# Patient Record
Sex: MALE | Race: OTHER
[De-identification: names, ages, dates, MRNs, and addresses within clinical notes are randomized per-mention and may not be internally consistent; named-entity substitution may affect disease eponyms.]

---

## 2019-10-02 ENCOUNTER — HOSPITAL ENCOUNTER (OUTPATIENT)
Dept: HOSPITAL 76 - EMS | Age: 39
End: 2019-10-02
Attending: SURGERY
Payer: COMMERCIAL

## 2019-10-02 ENCOUNTER — HOSPITAL ENCOUNTER (EMERGENCY)
Dept: HOSPITAL 76 - ED | Age: 39
Discharge: HOME | End: 2019-10-02
Payer: COMMERCIAL

## 2019-10-02 VITALS — DIASTOLIC BLOOD PRESSURE: 80 MMHG | SYSTOLIC BLOOD PRESSURE: 135 MMHG

## 2019-10-02 DIAGNOSIS — F43.9: ICD-10-CM

## 2019-10-02 DIAGNOSIS — R07.9: Primary | ICD-10-CM

## 2019-10-02 DIAGNOSIS — H66.002: ICD-10-CM

## 2019-10-02 DIAGNOSIS — R07.89: Primary | ICD-10-CM

## 2019-10-02 LAB
ALBUMIN DIAFP-MCNC: 4.7 G/DL (ref 3.2–5.5)
ALBUMIN/GLOB SERPL: 1.5 {RATIO} (ref 1–2.2)
ALP SERPL-CCNC: 57 IU/L (ref 42–121)
ALT SERPL W P-5'-P-CCNC: 49 IU/L (ref 10–60)
ANION GAP SERPL CALCULATED.4IONS-SCNC: 8 MMOL/L (ref 6–13)
AST SERPL W P-5'-P-CCNC: 26 IU/L (ref 10–42)
BASOPHILS NFR BLD AUTO: 0.1 10^3/UL (ref 0–0.1)
BASOPHILS NFR BLD AUTO: 0.6 %
BILIRUB BLD-MCNC: 1.1 MG/DL (ref 0.2–1)
BUN SERPL-MCNC: 20 MG/DL (ref 6–20)
CALCIUM UR-MCNC: 9.4 MG/DL (ref 8.5–10.3)
CHLORIDE SERPL-SCNC: 104 MMOL/L (ref 101–111)
CO2 SERPL-SCNC: 26 MMOL/L (ref 21–32)
CREAT SERPLBLD-SCNC: 1 MG/DL (ref 0.6–1.2)
EOSINOPHIL # BLD AUTO: 0.1 10^3/UL (ref 0–0.7)
EOSINOPHIL NFR BLD AUTO: 1.4 %
ERYTHROCYTE [DISTWIDTH] IN BLOOD BY AUTOMATED COUNT: 12.6 % (ref 12–15)
GFRSERPLBLD MDRD-ARVRAT: 84 ML/MIN/{1.73_M2} (ref 89–?)
GLOBULIN SER-MCNC: 3.1 G/DL (ref 2.1–4.2)
GLUCOSE SERPL-MCNC: 101 MG/DL (ref 70–100)
HGB UR QL STRIP: 17 G/DL (ref 14–18)
LIPASE SERPL-CCNC: 52 U/L (ref 22–51)
LYMPHOCYTES # SPEC AUTO: 1.7 10^3/UL (ref 1.5–3.5)
LYMPHOCYTES NFR BLD AUTO: 20.1 %
MCH RBC QN AUTO: 31.1 PG (ref 27–31)
MCHC RBC AUTO-ENTMCNC: 35.4 G/DL (ref 32–36)
MCV RBC AUTO: 87.9 FL (ref 80–94)
MONOCYTES # BLD AUTO: 0.6 10^3/UL (ref 0–1)
MONOCYTES NFR BLD AUTO: 7.4 %
NEUTROPHILS # BLD AUTO: 5.9 10^3/UL (ref 1.5–6.6)
NEUTROPHILS # SNV AUTO: 8.4 X10^3/UL (ref 4.8–10.8)
NEUTROPHILS NFR BLD AUTO: 70 %
PDW BLD AUTO: 9.7 FL (ref 7.4–11.4)
PLATELET # BLD: 286 10^3/UL (ref 130–450)
PROT SPEC-MCNC: 7.8 G/DL (ref 6.7–8.2)
RBC MAR: 5.46 10^6/UL (ref 4.7–6.1)
SODIUM SERPLBLD-SCNC: 138 MMOL/L (ref 135–145)

## 2019-10-02 PROCEDURE — 99283 EMERGENCY DEPT VISIT LOW MDM: CPT

## 2019-10-02 PROCEDURE — 71045 X-RAY EXAM CHEST 1 VIEW: CPT

## 2019-10-02 PROCEDURE — 36415 COLL VENOUS BLD VENIPUNCTURE: CPT

## 2019-10-02 PROCEDURE — 99284 EMERGENCY DEPT VISIT MOD MDM: CPT

## 2019-10-02 PROCEDURE — 84484 ASSAY OF TROPONIN QUANT: CPT

## 2019-10-02 PROCEDURE — 85025 COMPLETE CBC W/AUTO DIFF WBC: CPT

## 2019-10-02 PROCEDURE — 80053 COMPREHEN METABOLIC PANEL: CPT

## 2019-10-02 PROCEDURE — 83690 ASSAY OF LIPASE: CPT

## 2019-10-02 PROCEDURE — 93005 ELECTROCARDIOGRAM TRACING: CPT

## 2019-10-02 NOTE — XRAY REPORT
Reason:  chest pain

Procedure Date:  10/02/2019   

Accession Number:  [id number] / [id number]                    

Procedure:  XR  - Chest 1 View X-Ray CPT Code:  71559

 

FULL RESULT:

 

 

EXAM:

CHEST RADIOGRAPHY

 

EXAM DATE: 10/2/2019 11:27 AM.

 

CLINICAL HISTORY: Chest pain that radiates down left arm.

 

COMPARISON: None.

 

TECHNIQUE: 1 view.

 

FINDINGS:

Lungs/Pleura: No focal opacities evident. No pleural effusion. No 

pneumothorax.

 

Mediastinum: Within exam limitations, the cardiomediastinal contour is 

normal.

 

Other: None.

 

IMPRESSION:

1. No acute disease in the chest.

 

RADIA

## 2019-10-02 NOTE — ED PHYSICIAN DOCUMENTATION
PD HPI DYSPNEA





- Stated complaint


Stated Complaint: CHEST PAIN/LT ARM PX





- Chief complaint


Chief Complaint: Cardiac





- History obtained from


History obtained from: Patient, Family





- History of Present Illness


Timing - onset: Enter  time (0900), Today


Timing - onset during: Emotional event


Timing - duration: Minutes (40)


Timing - details: Abrupt onset, Still present in ED (reduced to left shoulder 

pain)


Inciting event(s): URI, Emotional event


Improved by: Other (having a say at a meeting)


Worsened by: No: Exertion, Coughing


Associated symptoms: Cough, Chest pain / discomfort.  No: Fever, Wheezing


Similar symptoms before: Has not had sx before


Recently seen: Not recently seen





- Additional information


Additional information: 





Previously well 39-year-old male was at work today when he began to experience 

some substernal chest pain with radiation into his left arm that he states 

occurred with stress at work.  He states that the stress is related to a 

coworker a single individual and there was a meeting regarding the individual 

today and the patient was able to get his say in.  He states that following that

he felt some improvement in his pain but he has this persistence of pain in his 

left shoulder.  He feels that this is all related to stress from this situation.

 He does have a family history of early coronary disease disease with his father

dying at age 36.  The patient denies any recent change in his physical stamina. 

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 

not noted any decreased stamina.





The patient denies any diaphoresis with the exception of some sweaty palms he 

denies nausea or dyspnea associated with this does state that he has had some 

cough and sputum production and he has some feeling of dizziness.





Review of Systems


Constitutional: denies: Fever, Chills, Myalgias


Eyes: denies: Decreased vision


Ears: denies: Ear pain


Nose: reports: Rhinorrhea / runny nose, Congestion


Throat: reports: Sore throat


Cardiac: reports: Chest pain / pressure.  denies: Palpitations


Respiratory: reports: Cough.  denies: Dyspnea, Wheezing


GI: denies: Abdominal Pain, Nausea, Vomiting


: denies: Dysuria, Frequency





PD PAST MEDICAL HISTORY





- Present Medications


Home Medications: 


                                Ambulatory Orders











 Medication  Instructions  Recorded  Confirmed


 


Azithromycin [Zithromax] 250 mg PO DAILY #6 tablet 10/02/19 














- Allergies


Allergies/Adverse Reactions: 


                                    Allergies











Allergy/AdvReac Type Severity Reaction Status Date / Time


 


No Known Drug Allergies Allergy   Verified 10/02/19 11:02














PD ED PE NORMAL





- Vitals


Vital signs reviewed: Yes (hypertensive )





- General


General: Alert and oriented X 3, No acute distress, Well developed/nourished





- HEENT


HEENT: Atraumatic, PERRL, EOMI, Pharynx benign, Other (Both TM's are inflamed 

the left is more involved with distortion of the landmarks. )





- Neck


Neck: Supple, no meningeal sign, No bony TTP





- Cardiac


Cardiac: RRR, No murmur





- Respiratory


Respiratory: No respiratory distress, Clear bilaterally





- Abdomen


Abdomen: Soft, Non tender





- Back


Back: No CVA TTP, No spinal TTP





- Derm


Derm: Normal color, Warm and dry, No rash





- Extremities


Extremities: No deformity, No edema





- Neuro


Neuro: Alert and oriented X 3, CNs 2-12 intact, No motor deficit, No sensory 

deficit, Normal speech


Eye Opening: Spontaneous


Motor: Obeys Commands


Verbal: Oriented


GCS Score: 15





- Psych


Psych: Normal mood, Normal affect





Results





- Vitals


Vitals: 


                               Vital Signs - 24 hr











  10/02/19 10/02/19 10/02/19





  11:02 12:04 15:21


 


Temperature 37.1 C  


 


Heart Rate 72 66 72


 


Respiratory 18 17 14





Rate   


 


Blood Pressure 150/78 H 136/80 H 135/80 H


 


O2 Saturation 97 99 99








                                     Oxygen











O2 Source                      Room air

















- EKG (time done)


  ** 1108


Rate: Rate (enter#) (69)


Rhythm: NSR


Ischemia: ST elevation c/w repol (in V2&V3 1mV)


Compare to prior EKG: Old EKG unavailable


Computer interpretation: Agree with computer





- Labs


Labs: 


                                Laboratory Tests











  10/02/19 10/02/19 10/02/19





  11:20 11:20 11:20


 


WBC  8.4  


 


RBC  5.46  


 


Hgb  17.0  


 


Hct  48.0  


 


MCV  87.9  


 


MCH  31.1 H  


 


MCHC  35.4  


 


RDW  12.6  


 


Plt Count  286  


 


MPV  9.7  


 


Neut # (Auto)  5.9  


 


Lymph # (Auto)  1.7  


 


Mono # (Auto)  0.6  


 


Eos # (Auto)  0.1  


 


Baso # (Auto)  0.1  


 


Absolute Nucleated RBC  0.00  


 


Nucleated RBC %  0.0  


 


Sodium   138 


 


Potassium   4.0 


 


Chloride   104 


 


Carbon Dioxide   26 


 


Anion Gap   8.0 


 


BUN   20 


 


Creatinine   1.0 


 


Estimated GFR (MDRD)   84 L 


 


Glucose   101 H 


 


Calcium   9.4 


 


Total Bilirubin   1.1 H 


 


AST   26 


 


ALT   49 


 


Alkaline Phosphatase   57 


 


Troponin I High Sens    3.0


 


Total Protein   7.8 


 


Albumin   4.7 


 


Globulin   3.1 


 


Albumin/Globulin Ratio   1.5 


 


Lipase   52 H 














  10/02/19





  13:59


 


WBC 


 


RBC 


 


Hgb 


 


Hct 


 


MCV 


 


MCH 


 


MCHC 


 


RDW 


 


Plt Count 


 


MPV 


 


Neut # (Auto) 


 


Lymph # (Auto) 


 


Mono # (Auto) 


 


Eos # (Auto) 


 


Baso # (Auto) 


 


Absolute Nucleated RBC 


 


Nucleated RBC % 


 


Sodium 


 


Potassium 


 


Chloride 


 


Carbon Dioxide 


 


Anion Gap 


 


BUN 


 


Creatinine 


 


Estimated GFR (MDRD) 


 


Glucose 


 


Calcium 


 


Total Bilirubin 


 


AST 


 


ALT 


 


Alkaline Phosphatase 


 


Troponin I High Sens  3.0


 


Total Protein 


 


Albumin 


 


Globulin 


 


Albumin/Globulin Ratio 


 


Lipase 














- Rads (name of study)


  ** Chest 


Radiology: Prelim report reviewed (Impression: 1.  No acute disease in the 

chest), EMP read indepedently, See rad report





PD MEDICAL DECISION MAKING





- ED course


Complexity details: reviewed results, re-evaluated patient, considered 

differential, d/w patient, d/w family


ED course: 





39-year-old male with a family history of early coronary disease has developed 

chest pain during a stressful situation at work today when he had to confront a 

situation with a coworker.  The patient has had improvement in his symptoms and 

his electric cardiogram is nondiagnostic he has two negative troponins and his 

pain is essentially resolved in the emergency department.  He does have a cough 

and congestion and on examination has otitis.  He is given a dose of 

dexamethasone 10 mg orally we will place him on some antibiotic for this.  I 

have encouraged the patient to follow-up with his primary care doctor to 

consider exercise treadmill test.





Departure





- Departure


Disposition: 01 Home, Self Care


Clinical Impression: 


 Atypical chest pain, Stress reaction





Otitis media


Qualifiers:


 Otitis media type: suppurative Chronicity: acute Laterality: left Recurrence: 

non-recurrent Spontaneous tympanic membrane rupture: without spontaneous rupture

 Qualified Code(s): H66.002 - Acute suppurative otitis media without spontaneous

 rupture of ear drum, left ear





Condition: Stable


Instructions:  ED Stress React, ED Chest Pain Atypical Unkn Cause, ED Otitis 

Media Acute Adult


Follow-Up: 


[facility] [Provider Group]


Prescriptions: 


Azithromycin [Zithromax] 250 mg PO DAILY #6 tablet


Discharge Date/Time: 10/02/19 15:27

## 2023-02-04 ENCOUNTER — HOSPITAL ENCOUNTER (OUTPATIENT)
Dept: HOSPITAL 76 - LAB.N | Age: 43
Discharge: HOME | End: 2023-02-04
Attending: PHYSICIAN ASSISTANT
Payer: COMMERCIAL

## 2023-02-04 DIAGNOSIS — D75.A: Primary | ICD-10-CM

## 2023-02-04 DIAGNOSIS — Z13.220: ICD-10-CM

## 2023-02-04 LAB
ALBUMIN DIAFP-MCNC: 4.6 G/DL (ref 3.2–5.5)
ALBUMIN/GLOB SERPL: 1.4 {RATIO} (ref 1–2.2)
ALP SERPL-CCNC: 50 IU/L (ref 42–121)
ALT SERPL W P-5'-P-CCNC: 49 IU/L (ref 10–60)
ANION GAP SERPL CALCULATED.4IONS-SCNC: 10 MMOL/L (ref 6–13)
AST SERPL W P-5'-P-CCNC: 23 IU/L (ref 10–42)
BASOPHILS NFR BLD AUTO: 0.1 10^3/UL (ref 0–0.1)
BASOPHILS NFR BLD AUTO: 0.8 %
BILIRUB BLD-MCNC: 1.8 MG/DL (ref 0.2–1)
BUN SERPL-MCNC: 16 MG/DL (ref 6–20)
CALCIUM UR-MCNC: 9.8 MG/DL (ref 8.5–10.3)
CHLORIDE SERPL-SCNC: 99 MMOL/L (ref 101–111)
CHOLEST SERPL-MCNC: 159 MG/DL
CO2 SERPL-SCNC: 29 MMOL/L (ref 21–32)
CREAT SERPLBLD-SCNC: 1 MG/DL (ref 0.6–1.2)
EOSINOPHIL # BLD AUTO: 0.2 10^3/UL (ref 0–0.7)
EOSINOPHIL NFR BLD AUTO: 2.9 %
ERYTHROCYTE [DISTWIDTH] IN BLOOD BY AUTOMATED COUNT: 12.5 % (ref 12–15)
GFRSERPLBLD MDRD-ARVRAT: 83 ML/MIN/{1.73_M2} (ref 89–?)
GLOBULIN SER-MCNC: 3.2 G/DL (ref 2.1–4.2)
GLUCOSE SERPL-MCNC: 98 MG/DL (ref 70–100)
HCT VFR BLD AUTO: 52.3 % (ref 42–52)
HDLC SERPL-MCNC: 34 MG/DL
HDLC SERPL: 4.7 {RATIO} (ref ?–5)
HGB UR QL STRIP: 16.9 G/DL (ref 14–18)
LDLC SERPL CALC-MCNC: 96 MG/DL
LDLC/HDLC SERPL: 2.8 {RATIO} (ref ?–3.6)
LYMPHOCYTES # SPEC AUTO: 2.6 10^3/UL (ref 1.5–3.5)
LYMPHOCYTES NFR BLD AUTO: 30.6 %
MCH RBC QN AUTO: 29.8 PG (ref 27–31)
MCHC RBC AUTO-ENTMCNC: 32.3 G/DL (ref 32–36)
MCV RBC AUTO: 92.2 FL (ref 80–94)
MONOCYTES # BLD AUTO: 0.7 10^3/UL (ref 0–1)
MONOCYTES NFR BLD AUTO: 8.1 %
NEUTROPHILS # BLD AUTO: 4.8 10^3/UL (ref 1.5–6.6)
NEUTROPHILS # SNV AUTO: 8.4 X10^3/UL (ref 4.8–10.8)
NEUTROPHILS NFR BLD AUTO: 57.4 %
NRBC # BLD AUTO: 0 /100WBC
NRBC # BLD AUTO: 0 X10^3/UL
PDW BLD AUTO: 10.3 FL (ref 7.4–11.4)
PLATELET # BLD: 276 10^3/UL (ref 130–450)
POTASSIUM SERPL-SCNC: 4.4 MMOL/L (ref 3.5–5)
PROT SPEC-MCNC: 7.8 G/DL (ref 6.7–8.2)
RBC MAR: 5.67 10^6/UL (ref 4.7–6.1)
SODIUM SERPLBLD-SCNC: 138 MMOL/L (ref 135–145)
TRIGL P FAST SERPL-MCNC: 143 MG/DL
VLDLC SERPL-SCNC: 29 MG/DL

## 2023-02-04 PROCEDURE — 80053 COMPREHEN METABOLIC PANEL: CPT

## 2023-02-04 PROCEDURE — 80061 LIPID PANEL: CPT

## 2023-02-04 PROCEDURE — 36415 COLL VENOUS BLD VENIPUNCTURE: CPT

## 2023-02-04 PROCEDURE — 85025 COMPLETE CBC W/AUTO DIFF WBC: CPT

## 2023-02-04 PROCEDURE — 83721 ASSAY OF BLOOD LIPOPROTEIN: CPT
